# Patient Record
Sex: MALE | URBAN - METROPOLITAN AREA
[De-identification: names, ages, dates, MRNs, and addresses within clinical notes are randomized per-mention and may not be internally consistent; named-entity substitution may affect disease eponyms.]

---

## 2019-08-10 ENCOUNTER — COMMUNICATION - HEALTHEAST (OUTPATIENT)
Dept: SCHEDULING | Facility: CLINIC | Age: 44
End: 2019-08-10

## 2019-08-12 ENCOUNTER — TELEPHONE (OUTPATIENT)
Dept: FAMILY MEDICINE | Facility: CLINIC | Age: 44
End: 2019-08-12

## 2019-08-12 NOTE — TELEPHONE ENCOUNTER
Date of discharge: 08/10/19  Facility of discharge: [facility]  Patient concerns about condition: Concerns include COUGHING CAUSES A LOT OF BACK AND CHEST PAIN, STILL FEELING NAUSEA AND DIZZY.  Patient concerns about medications: No concerns at this time.  Full med reconciliation will be completed at clinic visit.  Patient concerns about transitioning: No concerns at this time.  Clinic office visit appointment date: 08/19/19 with PCP  Patient reminded to bring all medications (prescription and over-the-counter) to clinic appointment: Yes     Recommended pt to go back to ED if not feeling better. Pt decline and states he will go see his PCP.    DINO Malone

## 2021-05-31 NOTE — TELEPHONE ENCOUNTER
RN Assessment/Reason for Call:   Okay to leave Detailed Message ;sometimes Answer machine will work, may cut out.  Patient calling in, on disability; discharged  From ER  [place] terrible, stomach bothering him. Ate lunch.  Vomiting, some abd. Pain.  Denies seizure HX.  Last drink 1.5 days ago.  ER SW gave him a number.    RN Action/Disposition:  Protocol recommends ER if > 4 hrs. vomiting or blood in vomit.  Call back if worse symptoms  Discussed home care measures.  Agrees to plan.     Makeda Ferreira, JOSE    [facility] Triage/med refill  8/10/2019  4:04 PM        Reason for Disposition    Sobering up from alcohol intoxication, questions about    Protocols used: ALCOHOL ABUSE AND [id number]

## 2021-06-16 PROBLEM — F10.930 ALCOHOL WITHDRAWAL SYNDROME WITHOUT COMPLICATION (H): Status: ACTIVE | Noted: 2019-08-08

## 2021-06-16 PROBLEM — F10.939 ALCOHOL WITHDRAWAL (H): Status: ACTIVE | Noted: 2019-08-08
